# Patient Record
Sex: MALE | Race: WHITE | NOT HISPANIC OR LATINO | Employment: FULL TIME | URBAN - METROPOLITAN AREA
[De-identification: names, ages, dates, MRNs, and addresses within clinical notes are randomized per-mention and may not be internally consistent; named-entity substitution may affect disease eponyms.]

---

## 2021-03-26 ENCOUNTER — VIRTUAL VISIT (OUTPATIENT)
Dept: FAMILY MEDICINE | Facility: CLINIC | Age: 31
End: 2021-03-26
Payer: COMMERCIAL

## 2021-03-26 DIAGNOSIS — Z20.822 EXPOSURE TO COVID-19 VIRUS: Primary | ICD-10-CM

## 2021-03-26 DIAGNOSIS — J02.9 SORE THROAT: ICD-10-CM

## 2021-03-26 DIAGNOSIS — Z20.822 EXPOSURE TO COVID-19 VIRUS: ICD-10-CM

## 2021-03-26 PROCEDURE — 99203 OFFICE O/P NEW LOW 30 MIN: CPT | Mod: 95 | Performed by: NURSE PRACTITIONER

## 2021-03-26 PROCEDURE — U0003 INFECTIOUS AGENT DETECTION BY NUCLEIC ACID (DNA OR RNA); SEVERE ACUTE RESPIRATORY SYNDROME CORONAVIRUS 2 (SARS-COV-2) (CORONAVIRUS DISEASE [COVID-19]), AMPLIFIED PROBE TECHNIQUE, MAKING USE OF HIGH THROUGHPUT TECHNOLOGIES AS DESCRIBED BY CMS-2020-01-R: HCPCS | Performed by: NURSE PRACTITIONER

## 2021-03-26 PROCEDURE — U0005 INFEC AGEN DETEC AMPLI PROBE: HCPCS | Performed by: NURSE PRACTITIONER

## 2021-03-26 RX ORDER — FINASTERIDE 1 MG/1
1 TABLET, FILM COATED ORAL DAILY
COMMUNITY

## 2021-03-26 NOTE — PROGRESS NOTES
Sreekanth is a 30 year old who is being evaluated via a billable video visit.      How would you like to obtain your AVS? MyChart  If the video visit is dropped, the invitation should be resent by: Text to cell phone: [phone]  Will anyone else be joining your video visit? No      Video Start Time: 11:13 AM    Assessment & Plan     Exposure to COVID-19 virus    - Symptomatic COVID-19 Virus (Coronavirus) by PCR; Future    Sore throat    - Symptomatic COVID-19 Virus (Coronavirus) by PCR; Future                 No follow-ups on file.    DAVIN Lizama CNP  [facility] [place]    Subjective   Sreekanth is a 30 year old who presents for the following health issues     HPI     Chief Complaint   Patient presents with     Covid Concern     exposed to covid and would like testing     Allergies     Patient reports allergy to antibiotic that caused paralysis unsure of name.       Patient was visiting family in [place] and notes that his family member was positive.  Exposure was for greater than 15 minutes within 6 feet without masks.  Last exposure was 3/21/21.  He notes a mild headache and mild throat irritation starting today.         Review of Systems   Constitutional, HEENT, cardiovascular, pulmonary, gi and gu systems are negative, except as otherwise noted.      Objective           Vitals:  No vitals were obtained today due to virtual visit.    Physical Exam   GENERAL: Healthy, alert and no distress  EYES: Eyes grossly normal to inspection.  No discharge or erythema, or obvious scleral/conjunctival abnormalities.  RESP: No audible wheeze, cough, or visible cyanosis.  No visible retractions or increased work of breathing.    SKIN: Visible skin clear. No significant rash, abnormal pigmentation or lesions.  NEURO: Cranial nerves grossly intact.  Mentation and speech appropriate for age.  PSYCH: Mentation appears normal, affect normal/bright, judgement and insight intact, normal speech and appearance  well-groomed.                Video-Visit Details    Type of service:  Video Visit    Video End Time:1125    Originating Location (pt. Location): Home    Distant Location (provider location):  [facility]     Platform used for Video Visit: [employer]

## 2021-03-27 LAB
LABORATORY COMMENT REPORT: NORMAL
SARS-COV-2 RNA RESP QL NAA+PROBE: NEGATIVE
SARS-COV-2 RNA RESP QL NAA+PROBE: NORMAL
SPECIMEN SOURCE: NORMAL
SPECIMEN SOURCE: NORMAL

## 2021-03-29 NOTE — RESULT ENCOUNTER NOTE
Dear Sreekanth,    Your recent test results are attached.      No Covid-19 detected.  If you have any symptoms, please let me know and we can retest.  I would continue your quarantine at home.    If you have any questions please feel free to contact [phone] or myself via [employer].    Sincerely,  Tati Ross, CNP

## 2021-05-02 ENCOUNTER — HEALTH MAINTENANCE LETTER (OUTPATIENT)
Age: 31
End: 2021-05-02

## 2021-08-17 ENCOUNTER — VIRTUAL VISIT (OUTPATIENT)
Dept: FAMILY MEDICINE | Facility: CLINIC | Age: 31
End: 2021-08-17
Payer: COMMERCIAL

## 2021-08-17 DIAGNOSIS — R07.0 THROAT PAIN: ICD-10-CM

## 2021-08-17 DIAGNOSIS — H92.01 OTALGIA, RIGHT: ICD-10-CM

## 2021-08-17 DIAGNOSIS — J20.9 ACUTE BRONCHITIS WITH SYMPTOMS > 10 DAYS: Primary | ICD-10-CM

## 2021-08-17 PROCEDURE — 99213 OFFICE O/P EST LOW 20 MIN: CPT | Mod: 95 | Performed by: FAMILY MEDICINE

## 2021-08-17 RX ORDER — AMOXICILLIN 875 MG
875 TABLET ORAL 2 TIMES DAILY
Qty: 20 TABLET | Refills: 0 | Status: SHIPPED | OUTPATIENT
Start: 2021-08-17 | End: 2021-08-27

## 2021-08-17 NOTE — PROGRESS NOTES
Sreekanth is a 31 year old who is being evaluated via a billable video visit.   How would you like to obtain your AVS? MyChart  If the video visit is dropped, the invitation should be resent by: Text to cell phone: [phone]  Will anyone else be joining your video visit? No      Video Start Time: 4:32 PM   Acute bronchitis with symptoms     Comment: I discussed the pathophysiology of bronchitis and likely bacterial vs viral etiology; given yellow phlegm and duration will do antibiotic  Plan: amoxicillin (AMOXIL) 875 MG tablet     Throat pain  Plan: amoxicillin (AMOXIL) 875 MG tablet       Otalgia, right  Comment: referred pain from eustachian tube  Plan: amoxicillin (AMOXIL) 875 MG tablet          Subjective   Sreekanth is a 31 year old who presents for the following health issues   HPI   Acute Illness  Acute illness concerns: Tickle in throat, starting to get ear ache Rt, haring is good, no drainage. A little cough. Had Covid infection in 2019. Mild cough with yellowish green phlegm. Denies loss of sense of smell or taste. Ftaigued, appetite not good.    He is a smoker.     Onset/Duration: last week  Symptoms:  Fever: no  Chills/Sweats: no  Headache (location?): no  Sinus Pressure: no  Conjunctivitis:  no  Ear Pain: YES: right  Rhinorrhea: no  Congestion: no  Sore Throat: YES  Cough: YES  Wheeze: no  Decreased Appetite: no  Nausea: no  Vomiting: no  Diarrhea: no  Dysuria/Freq.: more frequent but probably due to increase in liquids   Dysuria or Hematuria: no  Fatigue/Achiness: no  Sick/Strep Exposure: YES- wife has same issues but okay now  Therapies tried and outcome: ibuprofen, increase in liquids, honey and tea      Review of Systems         Objective           Vitals:  No vitals were obtained today due to virtual visit.    Physical Exam   GENERAL: Healthy, alert and no distress  RESP:  No visible increased work of breathing.    PSYCH: Mentation appears normal, affect normal/bright, normal speech and appearance  well-groomed.        Video-Visit Details    Type of service:  Video Visit    Video End Time:4:49 PM    Originating Location (pt. Location): Home    Distant Location (provider location):  [facility]     Platform used for Video Visit: [employer]

## 2021-09-28 ENCOUNTER — MYC MEDICAL ADVICE (OUTPATIENT)
Dept: FAMILY MEDICINE | Facility: CLINIC | Age: 31
End: 2021-09-28

## 2021-10-05 ENCOUNTER — VIRTUAL VISIT (OUTPATIENT)
Dept: FAMILY MEDICINE | Facility: CLINIC | Age: 31
End: 2021-10-05
Payer: COMMERCIAL

## 2021-10-05 DIAGNOSIS — R09.81 NASAL CONGESTION: Primary | ICD-10-CM

## 2021-10-05 DIAGNOSIS — Z20.822 ENCOUNTER FOR LABORATORY TESTING FOR COVID-19 VIRUS: ICD-10-CM

## 2021-10-05 PROCEDURE — 99213 OFFICE O/P EST LOW 20 MIN: CPT | Mod: 95 | Performed by: PHYSICIAN ASSISTANT

## 2021-10-05 NOTE — PROGRESS NOTES
Sreekanth is a 31 year old who is being evaluated via a billable video visit.      How would you like to obtain your AVS? MyChart  If the video visit is dropped, the invitation should be resent by: Text to cell phone: [phone]  Will anyone else be joining your video visit? No      Video Start Time: 4:56 PM    Assessment & Plan     Encounter for laboratory testing for COVID-19 virus  Nasal congestion  Sounds very congested. Cough may very well be due to post nasal drip as he is coughing up mucus in the morning. Suggest nasal rinse - twice a day for the next few days. Will touch base after covid test, if negative could consider antibiotic for sinus infection if symptoms are persisting. He agrees with the plan. No further questions.   - Symptomatic COVID-19 Virus (Coronavirus) by PCR; Future                   Return in about 2 weeks (around 10/19/2021) for worsening symptoms or failure to improve..    PHONG Santiago [facility]   Sreekanth is a 31 year old who presents for the following health issues     HPI     Acute Illness  Acute illness concerns: URI  Onset/Duration: 9/23/21  Symptoms:  Fever: no  Chills/Sweats: YES  Headache (location?): YES  Sinus Pressure: YES  Conjunctivitis:  no  Ear Pain: no  Rhinorrhea: YES  Congestion: YES  Sore Throat: no  Cough: YES - mucous in the AM  Wheeze: no  Decreased Appetite: YES  Nausea: YES  Vomiting: no  Diarrhea: YES  Dysuria/Freq.: no  Dysuria or Hematuria: no  Fatigue/Achiness: YES  Sick/Strep Exposure: no  Therapies tried and outcome: Muccinex and Claritin, Tylenol    Had a lot of sweating a few times. Never did have a documented fever.   Very congested. No difficulty breathing.         Review of Systems   Constitutional, HEENT, cardiovascular, pulmonary, gi and gu systems are negative, except as otherwise noted.      Objective           Vitals:  No vitals were obtained today due to virtual visit.    Physical Exam   GENERAL: Healthy,  alert and no distress  EYES: Eyes grossly normal to inspection.  No discharge or erythema, or obvious scleral/conjunctival abnormalities.  RESP: No audible wheeze, cough, or visible cyanosis.  No visible retractions or increased work of breathing.    SKIN: Visible skin clear. No significant rash, abnormal pigmentation or lesions.  NEURO: Cranial nerves grossly intact.  Mentation and speech appropriate for age.  PSYCH: Mentation appears normal, affect normal/bright, judgement and insight intact, normal speech and appearance well-groomed.                Video-Visit Details    Type of service:  Video Visit    Video End Time:5:08 PM    Originating Location (pt. Location): Home    Distant Location (provider location):  [facility]     Platform used for Video Visit: [employer]

## 2021-10-06 ENCOUNTER — LAB (OUTPATIENT)
Dept: URGENT CARE | Facility: URGENT CARE | Age: 31
End: 2021-10-06
Attending: PHYSICIAN ASSISTANT
Payer: COMMERCIAL

## 2021-10-06 DIAGNOSIS — R09.81 NASAL CONGESTION: ICD-10-CM

## 2021-10-06 DIAGNOSIS — Z20.822 ENCOUNTER FOR LABORATORY TESTING FOR COVID-19 VIRUS: ICD-10-CM

## 2021-10-06 PROCEDURE — U0005 INFEC AGEN DETEC AMPLI PROBE: HCPCS

## 2021-10-06 PROCEDURE — U0003 INFECTIOUS AGENT DETECTION BY NUCLEIC ACID (DNA OR RNA); SEVERE ACUTE RESPIRATORY SYNDROME CORONAVIRUS 2 (SARS-COV-2) (CORONAVIRUS DISEASE [COVID-19]), AMPLIFIED PROBE TECHNIQUE, MAKING USE OF HIGH THROUGHPUT TECHNOLOGIES AS DESCRIBED BY CMS-2020-01-R: HCPCS

## 2021-10-07 LAB — SARS-COV-2 RNA RESP QL NAA+PROBE: POSITIVE

## 2021-10-17 ENCOUNTER — HEALTH MAINTENANCE LETTER (OUTPATIENT)
Age: 31
End: 2021-10-17

## 2022-03-30 ENCOUNTER — OFFICE VISIT (OUTPATIENT)
Dept: FAMILY MEDICINE | Facility: CLINIC | Age: 32
End: 2022-03-30
Payer: COMMERCIAL

## 2022-03-30 VITALS
RESPIRATION RATE: 14 BRPM | DIASTOLIC BLOOD PRESSURE: 62 MMHG | HEIGHT: 70 IN | SYSTOLIC BLOOD PRESSURE: 114 MMHG | HEART RATE: 72 BPM | WEIGHT: 188.8 LBS | TEMPERATURE: 98.3 F | OXYGEN SATURATION: 97 % | BODY MASS INDEX: 27.03 KG/M2

## 2022-03-30 DIAGNOSIS — H65.91 OME (OTITIS MEDIA WITH EFFUSION), RIGHT: ICD-10-CM

## 2022-03-30 DIAGNOSIS — H72.91 RUPTURED EAR DRUM, RIGHT: Primary | ICD-10-CM

## 2022-03-30 PROCEDURE — 99213 OFFICE O/P EST LOW 20 MIN: CPT | Performed by: FAMILY MEDICINE

## 2022-03-30 RX ORDER — AMOXICILLIN 875 MG
875 TABLET ORAL 2 TIMES DAILY
Qty: 14 TABLET | Refills: 0 | Status: SHIPPED | OUTPATIENT
Start: 2022-03-30 | End: 2022-08-04

## 2022-03-30 NOTE — PROGRESS NOTES
"S  Sreekanth Thornton is a 31 year old male here for right ear pain.     1 month ago, his right ear drum burst. He had a bad cold and rolled onto his right ear and felt a \"pop\" and then couldn't hear and could feel fluid inside, bloody discharge came out. It hurt and was healing, but then burst on 3/10 again.     Now, he hasn't had the bursting sensation but the right ear started to hurt last night. Right side of throat feels tight.     He had ear  tubes placed as a child and states he has had a lot of ear infections and ruptured ear drums. He has not seen ENT in quite some time.   ?  O  /62   Pulse 72   Temp 98.3  F (36.8  C) (Oral)   Resp 14   Ht 1.781 m (5' 10.12\")   Wt 85.6 kg (188 lb 12.8 oz)   SpO2 97%   BMI 27.00 kg/m     Vitals reviewed. Nursing note reviewed.  General Appearance: Pleasant and alert, in no acute distress  HEENT: Left TM is normal, not bulging. Right TM has some raw tissue around it with what appears to be a healing rupture, with surrounding erythema and some blood. TM is bulging.   Skin: warm, dry, intact, no rash noted  Neuro: no focal deficits, CNs II-XII normal.   Psych: mood and affect are normal.    A/P  Sreekanth was seen today for otalgia.    Diagnoses and all orders for this visit:    Ruptured ear drum, right: right ear does appear infected. Will treat with amoxicillin and refer to ENT due to his complicated history.   -     amoxicillin (AMOXIL) 875 MG tablet; Take 1 tablet (875 mg) by mouth 2 times daily  -     Otolaryngology Referral; Future    OME (otitis media with effusion), right  -     amoxicillin (AMOXIL) 875 MG tablet; Take 1 tablet (875 mg) by mouth 2 times daily  -     Otolaryngology Referral; Future    Other orders  -     REVIEW OF HEALTH MAINTENANCE PROTOCOL ORDERS         No follow-ups on file.    Options for treatment and follow-up care were reviewed with the patient and/or guardian. Sreekanth Thornton and/or guardian engaged in the decision making process " and verbalized understanding of the options discussed and agreed with the final plan.    Shobha Maldonado MD

## 2022-05-29 ENCOUNTER — HEALTH MAINTENANCE LETTER (OUTPATIENT)
Age: 32
End: 2022-05-29

## 2022-07-26 ASSESSMENT — ENCOUNTER SYMPTOMS
HEARTBURN: 0
HEMATOCHEZIA: 0
EYE PAIN: 0
ABDOMINAL PAIN: 0
SORE THROAT: 0
PALPITATIONS: 0
FEVER: 0
FREQUENCY: 0
WEAKNESS: 0
NAUSEA: 0
SHORTNESS OF BREATH: 0
MYALGIAS: 0
HEMATURIA: 0
DYSURIA: 0
PARESTHESIAS: 0
DIZZINESS: 0
COUGH: 0
HEADACHES: 0
NERVOUS/ANXIOUS: 0
CHILLS: 0
JOINT SWELLING: 0
CONSTIPATION: 0
DIARRHEA: 0
ARTHRALGIAS: 0

## 2022-08-02 ENCOUNTER — OFFICE VISIT (OUTPATIENT)
Dept: FAMILY MEDICINE | Facility: CLINIC | Age: 32
End: 2022-08-02
Payer: COMMERCIAL

## 2022-08-02 VITALS
RESPIRATION RATE: 18 BRPM | DIASTOLIC BLOOD PRESSURE: 84 MMHG | OXYGEN SATURATION: 100 % | BODY MASS INDEX: 27.47 KG/M2 | HEIGHT: 71 IN | TEMPERATURE: 97.9 F | HEART RATE: 60 BPM | WEIGHT: 196.2 LBS | SYSTOLIC BLOOD PRESSURE: 132 MMHG

## 2022-08-02 DIAGNOSIS — Z00.00 ROUTINE HISTORY AND PHYSICAL EXAMINATION OF ADULT: Primary | ICD-10-CM

## 2022-08-02 DIAGNOSIS — Z11.59 NEED FOR HEPATITIS C SCREENING TEST: ICD-10-CM

## 2022-08-02 DIAGNOSIS — L91.8 SKIN TAG: ICD-10-CM

## 2022-08-02 DIAGNOSIS — D22.9 NUMEROUS SKIN MOLES: ICD-10-CM

## 2022-08-02 LAB
ANION GAP SERPL CALCULATED.3IONS-SCNC: 4 MMOL/L (ref 3–14)
BUN SERPL-MCNC: 14 MG/DL (ref 7–30)
CALCIUM SERPL-MCNC: 9.5 MG/DL (ref 8.5–10.1)
CHLORIDE BLD-SCNC: 104 MMOL/L (ref 94–109)
CHOLEST SERPL-MCNC: 230 MG/DL
CO2 SERPL-SCNC: 29 MMOL/L (ref 20–32)
CREAT SERPL-MCNC: 1.01 MG/DL (ref 0.66–1.25)
FASTING STATUS PATIENT QL REPORTED: ABNORMAL
GFR SERPL CREATININE-BSD FRML MDRD: >90 ML/MIN/1.73M2
GLUCOSE BLD-MCNC: 89 MG/DL (ref 70–99)
HCV AB SERPL QL IA: NONREACTIVE
HDLC SERPL-MCNC: 46 MG/DL
LDLC SERPL CALC-MCNC: 144 MG/DL
NONHDLC SERPL-MCNC: 184 MG/DL
POTASSIUM BLD-SCNC: 4.1 MMOL/L (ref 3.4–5.3)
SODIUM SERPL-SCNC: 137 MMOL/L (ref 133–144)
TRIGL SERPL-MCNC: 200 MG/DL

## 2022-08-02 PROCEDURE — 80061 LIPID PANEL: CPT | Performed by: FAMILY MEDICINE

## 2022-08-02 PROCEDURE — 86803 HEPATITIS C AB TEST: CPT | Performed by: FAMILY MEDICINE

## 2022-08-02 PROCEDURE — 99395 PREV VISIT EST AGE 18-39: CPT | Performed by: FAMILY MEDICINE

## 2022-08-02 PROCEDURE — 36415 COLL VENOUS BLD VENIPUNCTURE: CPT | Performed by: FAMILY MEDICINE

## 2022-08-02 PROCEDURE — 80048 BASIC METABOLIC PNL TOTAL CA: CPT | Performed by: FAMILY MEDICINE

## 2022-08-02 ASSESSMENT — ENCOUNTER SYMPTOMS
DIARRHEA: 0
CONSTIPATION: 0
DYSURIA: 0
WEAKNESS: 0
SORE THROAT: 0
ARTHRALGIAS: 0
HEARTBURN: 0
HEMATURIA: 0
MYALGIAS: 0
SHORTNESS OF BREATH: 0
HEADACHES: 0
FREQUENCY: 0
NERVOUS/ANXIOUS: 0
FEVER: 0
CHILLS: 0
ABDOMINAL PAIN: 0
JOINT SWELLING: 0
PARESTHESIAS: 0
NAUSEA: 0
HEMATOCHEZIA: 0
EYE PAIN: 0
COUGH: 0
PALPITATIONS: 0
DIZZINESS: 0

## 2022-08-02 ASSESSMENT — PAIN SCALES - GENERAL: PAINLEVEL: NO PAIN (0)

## 2022-08-02 NOTE — PROGRESS NOTES
SUBJECTIVE:   CC: Sreekanth Thornton is an 32 year old male who presents for preventative health visit.   Healthy Habits:     Getting at least 3 servings of Calcium per day:  NO    Bi-annual eye exam:  Yes    Dental care twice a year:  Yes    Sleep apnea or symptoms of sleep apnea:  None    Diet:  Regular (no restrictions) and Other    Frequency of exercise:  2-3 days/week    Duration of exercise:  30-45 minutes    Taking medications regularly:  Yes    Medication side effects:  Not applicable    PHQ-2 Total Score: 0    Additional concerns today:  Yes    PROBLEMS TO ADD ON...  Mole/Skin tag: Genital was brown and flat and now elevated and purplish. Size same    Today's PHQ-2 Score:   PHQ-2 ( 1999 Pfizer) 7/26/2022   Q1: Little interest or pleasure in doing things 0   Q2: Feeling down, depressed or hopeless 0   PHQ-2 Score 0   PHQ-2 Total Score (12-17 Years)- Positive if 3 or more points; Administer PHQ-A if positive -   Q1: Little interest or pleasure in doing things Not at all   Q2: Feeling down, depressed or hopeless Not at all   PHQ-2 Score 0     Abuse: Current or Past(Physical, Sexual or Emotional)- No  Do you feel safe in your environment? Yes    Social History     Tobacco Use     Smoking status: Former Smoker     Packs/day: 0.00     Years: 10.00     Pack years: 0.00     Types: Cigarettes     Start date: 1/1/2010     Quit date: 1/1/2019     Years since quitting: 3.5     Smokeless tobacco: Never Used     Tobacco comment: Haven't used in well over 6 months and don't plan to   Substance Use Topics     Alcohol use: Yes     If you drink alcohol do you typically have >3 drinks per day or >7 drinks per week? Yes    Alcohol Use 7/26/2022   Prescreen: >3 drinks/day or >7 drinks/week? Yes   AUDIT SCORE  5     AUDIT - Alcohol Use Disorders Identification Test - Reproduced from the World Health Organization Audit 2001 (Second Edition) 7/26/2022   1.  How often do you have a drink containing alcohol? 4 or more times a week    2.  How many drinks containing alcohol do you have on a typical day when you are drinking? 1 or 2   3.  How often do you have five or more drinks on one occasion? Less than monthly   4.  How often during the last year have you found that you were not able to stop drinking once you had started? Never   5.  How often during the last year have you failed to do what was normally expected of you because of drinking? Never   6.  How often during the last year have you needed a first drink in the morning to get yourself going after a heavy drinking session? Never   7.  How often during the last year have you had a feeling of guilt or remorse after drinking? Never   8.  How often during the last year have you been unable to remember what happened the night before because of your drinking? Never   9.  Have you or someone else been injured because of your drinking? No   10. Has a relative, friend, doctor or other health care worker been concerned about your drinking or suggested you cut down? No   TOTAL SCORE 5       Last PSA: No results found for: PSA    Reviewed orders with patient. Reviewed health maintenance and updated orders accordingly - Yes  There is no problem list on file for this patient.    Past Surgical History:   Procedure Laterality Date     TONSILLECTOMY & ADENOIDECTOMY         Social History     Tobacco Use     Smoking status: Former Smoker     Packs/day: 0.00     Years: 10.00     Pack years: 0.00     Types: Cigarettes     Start date: 1/1/2010     Quit date: 1/1/2019     Years since quitting: 3.5     Smokeless tobacco: Never Used     Tobacco comment: Haven't used in well over 6 months and don't plan to   Substance Use Topics     Alcohol use: Yes     Family History   Problem Relation Age of Onset     Hypertension Father      Rheumatoid Arthritis Mother      Diabetes Maternal Grandmother      Cancer Maternal Grandfather      Breast Cancer Maternal Grandfather      Breast Cancer Paternal Grandmother       "      Reviewed and updated as needed this visit by clinical staff   Tobacco  Allergies  Meds   Med Hx  Surg Hx  Fam Hx  Soc Hx        Reviewed and updated as needed this visit by Provider                   Review of Systems   Constitutional: Negative for chills and fever.   HENT: Negative for congestion, ear pain, hearing loss and sore throat.    Eyes: Negative for pain and visual disturbance.   Respiratory: Negative for cough and shortness of breath.    Cardiovascular: Negative for chest pain, palpitations and peripheral edema.   Gastrointestinal: Negative for abdominal pain, constipation, diarrhea, heartburn, hematochezia and nausea.   Genitourinary: Negative for dysuria, frequency, genital sores, hematuria, impotence, penile discharge and urgency.   Musculoskeletal: Negative for arthralgias, joint swelling and myalgias.   Skin: Negative for rash.   Neurological: Negative for dizziness, weakness, headaches and paresthesias.   Psychiatric/Behavioral: Negative for mood changes. The patient is not nervous/anxious.        OBJECTIVE:   /84 (BP Location: Right arm)   Pulse 60   Temp 97.9  F (36.6  C) (Oral)   Resp 18   Ht 1.81 m (5' 11.26\")   Wt 89 kg (196 lb 3.2 oz)   SpO2 100%   BMI 27.17 kg/m      Physical Exam  GENERAL: healthy, alert and no distress  EYES: Eyes grossly normal to inspection, PERRL and conjunctivae and sclerae normal  HENT: ear canals and TM's normal, nose and mouth without ulcers or lesions  NECK: no adenopathy and thyroid normal to palpation  RESP: lungs clear to auscultation - no rales, rhonchi or wheezes  CV: regular rate and rhythm, normal S1 S2, no S3 or S4, no murmur, click or rub, no peripheral edema   ABDOMEN: soft, nontender, no hepatosplenomegaly, no masses and bowel sounds normal  MS: no gross musculoskeletal defects noted, no edema  SKIN: skin moles and tags  NEURO: Normal strength and tone, mentation intact and speech normal  PSYCH: mentation appears normal, affect " "normal/bright    Diagnostic Test Results:  Labs reviewed in Epic    ASSESSMENT/PLAN:   Sreekanth was seen today for physical.    Diagnoses and all orders for this visit:    Routine history and physical examination of adult  -     Lipid Profile (Chol, Trig, HDL, LDL calc); Future  -     Basic metabolic panel  (Ca, Cl, CO2, Creat, Gluc, K, Na, BUN); Future  -     Lipid Profile (Chol, Trig, HDL, LDL calc)  -     Basic metabolic panel  (Ca, Cl, CO2, Creat, Gluc, K, Na, BUN)    Numerous skin moles    Need for hepatitis C screening test  -     Hepatitis C Screen Reflex to HCV RNA Quant and Genotype; Future  -     Hepatitis C Screen Reflex to HCV RNA Quant and Genotype    Skin tag (Lt groin)        Patient has been advised of split billing requirements and indicates understanding: Yes    COUNSELING:   Reviewed preventive health counseling, as reflected in patient instructions       Regular exercise       Healthy diet/nutrition       Consider Hep C screening for all patients one time for ages 18-79 years    Estimated body mass index is 27.17 kg/m  as calculated from the following:    Height as of this encounter: 1.81 m (5' 11.26\").    Weight as of this encounter: 89 kg (196 lb 3.2 oz).     Weight management plan: Discussed healthy diet and exercise guidelines    He reports that he quit smoking about 3 years ago. His smoking use included cigarettes. He started smoking about 12 years ago. He smoked 0.00 packs per day for 10.00 years. He has never used smokeless tobacco.      Counseling Resources:  ATP IV Guidelines  Pooled Cohorts Equation Calculator  FRAX Risk Assessment  ICSI Preventive Guidelines  Dietary Guidelines for Americans, 2010  USDA's MyPlate  ASA Prophylaxis  Lung CA Screening    Cal Newberry MD  [facility]  "

## 2022-09-21 ENCOUNTER — MYC MEDICAL ADVICE (OUTPATIENT)
Dept: FAMILY MEDICINE | Facility: CLINIC | Age: 32
End: 2022-09-21

## 2022-09-21 NOTE — TELEPHONE ENCOUNTER
Responded to patient via [employer] provided contact number to the ENT being referred and pharmacy of antibiotic being sent to provided.      TAY HartN, RN  [facility]

## 2022-10-02 ENCOUNTER — HEALTH MAINTENANCE LETTER (OUTPATIENT)
Age: 32
End: 2022-10-02

## 2022-10-24 ENCOUNTER — E-VISIT (OUTPATIENT)
Dept: FAMILY MEDICINE | Facility: CLINIC | Age: 32
End: 2022-10-24
Payer: COMMERCIAL

## 2022-10-24 DIAGNOSIS — H66.90 CHRONIC OTITIS MEDIA, UNSPECIFIED OTITIS MEDIA TYPE: Primary | ICD-10-CM

## 2022-10-24 PROCEDURE — 99421 OL DIG E/M SVC 5-10 MIN: CPT | Performed by: FAMILY MEDICINE

## 2022-10-24 NOTE — PATIENT INSTRUCTIONS
Thank you for choosing us for your care. I have placed an order for a prescription so that you can start treatment. View your full visit summary for details by clicking on the link below. Your pharmacist will able to address any questions you may have about the medication.     If you're not feeling better within 5-7 days, please schedule an appointment.  You can schedule an appointment right here in [facility], or call [phone]  If the visit is for the same symptoms as your eVisit, we'll refund the cost of your eVisit if seen within seven days.

## 2022-11-16 ENCOUNTER — OFFICE VISIT (OUTPATIENT)
Dept: OTOLARYNGOLOGY | Facility: CLINIC | Age: 32
End: 2022-11-16
Attending: FAMILY MEDICINE
Payer: COMMERCIAL

## 2022-11-16 ENCOUNTER — OFFICE VISIT (OUTPATIENT)
Dept: AUDIOLOGY | Facility: CLINIC | Age: 32
End: 2022-11-16
Attending: FAMILY MEDICINE
Payer: COMMERCIAL

## 2022-11-16 VITALS
OXYGEN SATURATION: 99 % | SYSTOLIC BLOOD PRESSURE: 128 MMHG | DIASTOLIC BLOOD PRESSURE: 84 MMHG | HEART RATE: 76 BPM | RESPIRATION RATE: 16 BRPM

## 2022-11-16 DIAGNOSIS — H90.2 CONDUCTIVE HEARING LOSS, UNILATERAL: Primary | ICD-10-CM

## 2022-11-16 DIAGNOSIS — H90.2 CONDUCTIVE HEARING LOSS, UNILATERAL: ICD-10-CM

## 2022-11-16 DIAGNOSIS — H65.91 OME (OTITIS MEDIA WITH EFFUSION), RIGHT: ICD-10-CM

## 2022-11-16 DIAGNOSIS — H90.11 CONDUCTIVE HEARING LOSS OF RIGHT EAR WITH UNRESTRICTED HEARING OF LEFT EAR: ICD-10-CM

## 2022-11-16 PROCEDURE — 99203 OFFICE O/P NEW LOW 30 MIN: CPT | Performed by: OTOLARYNGOLOGY

## 2022-11-16 PROCEDURE — 92557 COMPREHENSIVE HEARING TEST: CPT | Performed by: AUDIOLOGIST

## 2022-11-16 PROCEDURE — 92550 TYMPANOMETRY & REFLEX THRESH: CPT | Performed by: AUDIOLOGIST

## 2022-11-16 RX ORDER — PSEUDOEPHEDRINE HCL 120 MG/1
120 TABLET, FILM COATED, EXTENDED RELEASE ORAL DAILY
Qty: 30 TABLET | Refills: 0 | Status: SHIPPED | OUTPATIENT
Start: 2022-11-16

## 2022-11-16 NOTE — PROGRESS NOTES
AUDIOLOGY REPORT:    Patient was referred from ENT by Carlyle Jimenez MD for audiology evaluation. he reports multiple ear infections over his lifetime with multiple ear drum perforations.  Occasional tinnitus, otalgia, and past noise exposure are reported.Patient reports PE tubes as a toddler.    Testing:    Otoscopy:   Otoscopic exam indicates ears are clear of cerumen bilaterally     Tympanograms:    RIGHT: restricted eardrum mobility      LEFT:   normal eardrum mobility    Reflexes (reported by stimulus ear):  RIGHT: Ipsilateral is elevated at frequencies tested  RIGHT: Contralateral is absent at frequencies tested  LEFT:   Ipsilateral is absent at frequencies tested  LEFT:   Contralateral is absent at frequencies tested    Thresholds:   Pure Tone Thresholds assessed using conventional audiometry with good reliability from 250-8000 Hz bilaterally using insert earphones and circumaural headphones      RIGHT:  normal and borderline-normal sloping to mild, mild-moderate and moderate conductive hearing loss    LEFT:    normal hearing sensitivity    Speech Reception Threshold:    RIGHT: 20 dB HL    LEFT:   10 dB HL  Speech Reception Thresholds are in good agreement with pure tone thresholds.    Word Recognition Score:     RIGHT: 100% at 60 dB HL using NU-6 recorded word list.    LEFT:   100% at 50 dB HL using NU-6 recorded word list.    Discussed results with the patient.     Patient was returned to ENT for follow up.     Fabian Sethi MA, CCC-A  Licensed Audiologist #[id number]  11/16/2022

## 2022-11-16 NOTE — LETTER
11/16/2022         RE: Sreekanth Thornton  [address] [facility] [address]        Dear Colleague,    Thank you for referring your patient, Sreekanth Thornton, to the [facility]. Please see a copy of my visit note below.    I am seeing this patient in consultation for chronic suppurative otitis media right ear at the request of the provider Shobha Moran.    Chief Complaint - Hearing loss    History of Present Illness - Sreekanth Thornton is a 32 year old male who presents right ear hearing loss. In the last 18 months he has had multiple ear infections and feeling the right tympanic membrane has ruptured. No left ear infection. He had tubes as a child. He has had drainage 3 times when he feels his tympanic membrane has ruptured. He feels the hearing goes back to normal, but not the last couple months. It can be very painful, but not now. Quit smoking a couple years ago. He has allergies and that has been bad the last 2 months. Any time he has congestion he seems to get ear plugging. Had T&A as a child.    Tests personally reviewed today for this visit:   Audiogram - normal left hearing, right hearing conductive hearing loss  Tympanograms - type A left, type B right.     Past Medical History - healthy     Current Medications -   Current Outpatient Medications:      amoxicillin-clavulanate (AUGMENTIN) 875-125 MG tablet, Take 1 tablet by mouth 2 times daily, Disp: 14 tablet, Rfl: 0     finasteride (PROPECIA) 1 MG tablet, Take 1 mg by mouth daily, Disp: , Rfl:     Allergies -   Allergies   Allergen Reactions     Azithromycin Unknown     Biaxin [Clarithromycin]      Ceclor [Cefaclor]      Erythromycin      Fusarium      Other Drug Allergy (See Comments)      Pediazole  Nigrospora     Penicillins      Rocephin [Ceftriaxone]        Social History -   Social History     Socioeconomic History     Marital status:    Tobacco Use     Smoking status: Former     Packs/day: 0.00     Years:  10.00     Pack years: 0.00     Types: Cigarettes     Start date: 1/1/2010     Quit date: 1/1/2019     Years since quitting: 3.8     Smokeless tobacco: Never     Tobacco comments:     Haven't used in well over 6 months and don't plan to   Substance and Sexual Activity     Alcohol use: Yes     Drug use: Not Currently     Types: Marijuana     Sexual activity: Yes     Partners: Female     Birth control/protection: None     Comment:    Other Topics Concern     Parent/sibling w/ CABG, MI or angioplasty before 65F 55M? No       Family History -   Family History   Problem Relation Age of Onset     Hypertension Father      Rheumatoid Arthritis Mother      Diabetes Maternal Grandmother      Cancer Maternal Grandfather      Breast Cancer Maternal Grandfather      Breast Cancer Paternal Grandmother        Review of Systems - As per HPI and PMHx, otherwise 7 system review of the head and neck negative.    Physical Exam  /84   Pulse 76   Resp 16   SpO2 99%   General - The patient is nontoxic, in no distress.  Alert and oriented to person and place, answers questions and cooperates with examination appropriately.   Voice and Breathing - The patient was breathing comfortably without the use of accessory muscles. There was no wheezing, stridor, or stertor.  The patients voice was clear and strong.  Ears - clean canals. The tympanic membrane on the right is intact, but appears dull with a middle ear effusion. No acute infection. No fluid or purulence was seen in the external canal. The tympanic membrane on the left is intact, no middle ear effusion. No acute infection.  No fluid or purulence was seen in the external canal.   Nose - Septum midline. Turbinates normal size. Airway patent bilaterally. No polyps, masses, or pus.   Eyes - Extraocular movements intact. Sclera were not icteric or injected.  Mouth - Examination of the oral cavity showed pink, healthy mucosa. No lesions or ulcerations noted.  The tongue was  mobile and midline.  Throat - The walls of the oropharynx were smooth, symmetric, and had no lesions or ulcerations.  The uvula was midline on elevation.    Neck - Soft, non-tender. Palpation of the occipital, submental, submandibular, internal jugular chain, and supraclavicular nodes did not demonstrate any abnormal lymph nodes or masses. No parotid masses. Palpation of the thyroid was soft and smooth, with no nodules or goiter appreciated.  The trachea was mobile and midline.  Neurological - Cranial nerves 2 through 12 were grossly intact. House-Brackmann grade 1 out of 6 bilaterally.    Assessment and Plan - Sreekanth Thornton is a 32 year old male who presents to me today with hearing loss.  This is most consistent with a middle ear effusion in the right ear. This is likely due to transient eustachian tube dysfunction following an upper respiratory infection and allergies.  However he seems to get this and/or acute otitis media of the right ear a few times a year.  I have advised trying sudafed, flonase, zyrtec and daily valsalva to try and improve the eustachian tube function.  If this fails I recommend returning for right myringotomy with tube placement.      Jesus Jimenez MD  Otolaryngology  [facility]          Again, thank you for allowing me to participate in the care of your patient.        Sincerely,        Jesus Jimenez MD

## 2022-11-16 NOTE — PROGRESS NOTES
I am seeing this patient in consultation for chronic suppurative otitis media right ear at the request of the provider Shobha Moran.    Chief Complaint - Hearing loss    History of Present Illness - Sreekanth Thornton is a 32 year old male who presents right ear hearing loss. In the last 18 months he has had multiple ear infections and feeling the right tympanic membrane has ruptured. No left ear infection. He had tubes as a child. He has had drainage 3 times when he feels his tympanic membrane has ruptured. He feels the hearing goes back to normal, but not the last couple months. It can be very painful, but not now. Quit smoking a couple years ago. He has allergies and that has been bad the last 2 months. Any time he has congestion he seems to get ear plugging. Had T&A as a child.    Tests personally reviewed today for this visit:   Audiogram - normal left hearing, right hearing conductive hearing loss  Tympanograms - type A left, type B right.     Past Medical History - healthy     Current Medications -   Current Outpatient Medications:      amoxicillin-clavulanate (AUGMENTIN) 875-125 MG tablet, Take 1 tablet by mouth 2 times daily, Disp: 14 tablet, Rfl: 0     finasteride (PROPECIA) 1 MG tablet, Take 1 mg by mouth daily, Disp: , Rfl:     Allergies -   Allergies   Allergen Reactions     Azithromycin Unknown     Biaxin [Clarithromycin]      Ceclor [Cefaclor]      Erythromycin      Fusarium      Other Drug Allergy (See Comments)      Pediazole  Nigrospora     Penicillins      Rocephin [Ceftriaxone]        Social History -   Social History     Socioeconomic History     Marital status:    Tobacco Use     Smoking status: Former     Packs/day: 0.00     Years: 10.00     Pack years: 0.00     Types: Cigarettes     Start date: 1/1/2010     Quit date: 1/1/2019     Years since quitting: 3.8     Smokeless tobacco: Never     Tobacco comments:     Haven't used in well over 6 months and don't plan to   Substance and  Sexual Activity     Alcohol use: Yes     Drug use: Not Currently     Types: Marijuana     Sexual activity: Yes     Partners: Female     Birth control/protection: None     Comment:    Other Topics Concern     Parent/sibling w/ CABG, MI or angioplasty before 65F 55M? No       Family History -   Family History   Problem Relation Age of Onset     Hypertension Father      Rheumatoid Arthritis Mother      Diabetes Maternal Grandmother      Cancer Maternal Grandfather      Breast Cancer Maternal Grandfather      Breast Cancer Paternal Grandmother        Review of Systems - As per HPI and PMHx, otherwise 7 system review of the head and neck negative.    Physical Exam  /84   Pulse 76   Resp 16   SpO2 99%   General - The patient is nontoxic, in no distress.  Alert and oriented to person and place, answers questions and cooperates with examination appropriately.   Voice and Breathing - The patient was breathing comfortably without the use of accessory muscles. There was no wheezing, stridor, or stertor.  The patients voice was clear and strong.  Ears - clean canals. The tympanic membrane on the right is intact, but appears dull with a middle ear effusion. No acute infection. No fluid or purulence was seen in the external canal. The tympanic membrane on the left is intact, no middle ear effusion. No acute infection.  No fluid or purulence was seen in the external canal.   Nose - Septum midline. Turbinates normal size. Airway patent bilaterally. No polyps, masses, or pus.   Eyes - Extraocular movements intact. Sclera were not icteric or injected.  Mouth - Examination of the oral cavity showed pink, healthy mucosa. No lesions or ulcerations noted.  The tongue was mobile and midline.  Throat - The walls of the oropharynx were smooth, symmetric, and had no lesions or ulcerations.  The uvula was midline on elevation.    Neck - Soft, non-tender. Palpation of the occipital, submental, submandibular, internal jugular  chain, and supraclavicular nodes did not demonstrate any abnormal lymph nodes or masses. No parotid masses. Palpation of the thyroid was soft and smooth, with no nodules or goiter appreciated.  The trachea was mobile and midline.  Neurological - Cranial nerves 2 through 12 were grossly intact. House-Brackmann grade 1 out of 6 bilaterally.    Assessment and Plan - Sreekanth Thornton is a 32 year old male who presents to me today with hearing loss.  This is most consistent with a middle ear effusion in the right ear. This is likely due to transient eustachian tube dysfunction following an upper respiratory infection and allergies.  However he seems to get this and/or acute otitis media of the right ear a few times a year.  I have advised trying sudafed, flonase, zyrtec and daily valsalva to try and improve the eustachian tube function.  If this fails I recommend returning for right myringotomy with tube placement.      Jesus Jimenez MD  Otolaryngology  [facility]

## 2023-10-21 ENCOUNTER — HEALTH MAINTENANCE LETTER (OUTPATIENT)
Age: 33
End: 2023-10-21

## 2023-12-07 ASSESSMENT — ENCOUNTER SYMPTOMS
PALPITATIONS: 0
JOINT SWELLING: 0
FREQUENCY: 0
DYSURIA: 0
HEMATURIA: 0
HEADACHES: 0
FEVER: 0
HEMATOCHEZIA: 0
EYE PAIN: 0
CONSTIPATION: 0
NERVOUS/ANXIOUS: 0
ARTHRALGIAS: 0
SHORTNESS OF BREATH: 0
ABDOMINAL PAIN: 0
SORE THROAT: 0
CHILLS: 0
NAUSEA: 0
WEAKNESS: 0
MYALGIAS: 0
DIARRHEA: 1
COUGH: 0
PARESTHESIAS: 0
DIZZINESS: 0
HEARTBURN: 0

## 2023-12-12 ENCOUNTER — OFFICE VISIT (OUTPATIENT)
Dept: FAMILY MEDICINE | Facility: CLINIC | Age: 33
End: 2023-12-12
Payer: COMMERCIAL

## 2023-12-12 VITALS
TEMPERATURE: 97.5 F | HEIGHT: 71 IN | DIASTOLIC BLOOD PRESSURE: 81 MMHG | HEART RATE: 98 BPM | SYSTOLIC BLOOD PRESSURE: 128 MMHG | BODY MASS INDEX: 27.5 KG/M2 | OXYGEN SATURATION: 98 % | RESPIRATION RATE: 16 BRPM | WEIGHT: 196.4 LBS

## 2023-12-12 DIAGNOSIS — R19.5 LOOSE STOOLS: ICD-10-CM

## 2023-12-12 DIAGNOSIS — Z00.00 ROUTINE HISTORY AND PHYSICAL EXAMINATION OF ADULT: Primary | ICD-10-CM

## 2023-12-12 DIAGNOSIS — E78.5 HYPERLIPIDEMIA LDL GOAL <130: ICD-10-CM

## 2023-12-12 DIAGNOSIS — L65.9 HAIR LOSS: ICD-10-CM

## 2023-12-12 PROCEDURE — 99395 PREV VISIT EST AGE 18-39: CPT | Performed by: FAMILY MEDICINE

## 2023-12-12 NOTE — PROGRESS NOTES
"SUBJECTIVE:   Sreekanth is a 33 year old, presenting for the following:  Physical        2023    11:37 AM   Additional Questions   Roomed by MIGUEL ÁNGEL Gonzalez    Today's PHQ-2 Score:       2023    11:36 AM   PHQ-2 (  [employer])   Q1: Little interest or pleasure in doing things 0   Q2: Feeling down, depressed or hopeless 0   PHQ-2 Score 0   Q1: Little interest or pleasure in doing things Not at all   Q2: Feeling down, depressed or hopeless Not at all   PHQ-2 Score 0     Shots:  TDAP: had one in last 10 yrs   Defers: Covid, Flu shot    PROBLEMS TO ADD ON...    Hair Loss:   - Been on Minoxidil 5% and Finasteride 1 mg    Loose stools with breads: ?    Keri pizza    Have you ever done Advance Care Planning? (For example, a Health Directive, POLST, or a discussion with a medical provider or your loved ones about your wishes): No, advance care planning information given to patient to review.  Patient plans to discuss their wishes with loved ones or provider.      Social History     Tobacco Use    Smoking status: Former     Packs/day: 0.00     Years: 10.00     Additional pack years: 0.00     Total pack years: 0.00     Types: Cigarettes     Start date: 2010     Quit date: 2019     Years since quittin.9    Smokeless tobacco: Never    Tobacco comments:     Haven't used in well over 6 months and don't plan to   Substance Use Topics    Alcohol use: Yes           2023    11:30 AM   Alcohol Use   Prescreen: >3 drinks/day or >7 drinks/week? No       Last PSA: No results found for: \"PSA\"    Reviewed orders with patient. Reviewed health maintenance and updated orders accordingly - Yes  There is no problem list on file for this patient.    Past Surgical History:   Procedure Laterality Date    TONSILLECTOMY & ADENOIDECTOMY         Social History     Tobacco Use    Smoking status: Former     Packs/day: 0.00     Years: 10.00     Additional pack years: 0.00     Total pack years: 0.00     Types: Cigarettes     " "Start date: 2010     Quit date: 2019     Years since quittin.9    Smokeless tobacco: Never    Tobacco comments:     Haven't used in well over 6 months and don't plan to   Substance Use Topics    Alcohol use: Yes     Family History   Problem Relation Age of Onset    Hypertension Father     Rheumatoid Arthritis Mother     Diabetes Maternal Grandmother     Cancer Maternal Grandfather     Breast Cancer Maternal Grandfather     Breast Cancer Paternal Grandmother            Reviewed and updated as needed this visit by clinical staff   Tobacco  Allergies  Meds              Reviewed and updated as needed this visit by Provider                     Review of Systems  CONSTITUTIONAL: NEGATIVE for fever, chills, change in weight  INTEGUMENTARY/SKIN: NEGATIVE for worrisome rashes, moles or lesions  EYES: NEGATIVE for vision changes or irritation  ENT: NEGATIVE for ear, mouth and throat problems  RESP: NEGATIVE for significant cough or SOB  CV: NEGATIVE for chest pain, palpitations or peripheral edema  GI: NEGATIVE for nausea, abdominal pain, heartburn, or change in bowel habits   male: negative for dysuria, hematuria, decreased urinary stream, erectile dysfunction, urethral discharge  MUSCULOSKELETAL: NEGATIVE for significant arthralgias or myalgia  NEURO: NEGATIVE for weakness, dizziness or paresthesias  PSYCHIATRIC: NEGATIVE for changes in mood or affect    OBJECTIVE:   /81 (BP Location: Left arm, Patient Position: Sitting, Cuff Size: Adult Regular)   Pulse 98   Temp 97.5  F (36.4  C) (Oral)   Resp 16   Ht 1.803 m (5' 11\")   Wt 89.1 kg (196 lb 6.4 oz)   SpO2 98%   BMI 27.39 kg/m      Physical Exam  GENERAL: healthy, alert and no distress  EYES: Eyes grossly normal to inspection, PERRL and conjunctivae and sclerae normal  HENT: ear canals and TM's normal, nose and mouth without ulcers or lesions  NECK: no adenopathy and thyroid normal to palpation  RESP: lungs clear to auscultation - no rales, " rhonchi or wheezes  CV: regular rate and rhythm, normal S1 S2, no S3 or S4, no murmur, click or rub, no peripheral edema   ABDOMEN: soft, nontender, no hepatosplenomegaly, no masses and bowel sounds normal  MS: no gross musculoskeletal defects noted, no edema  SKIN: no suspicious lesions or rashes  NEURO: Normal strength and tone, mentation intact and speech normal  PSYCH: mentation appears normal, affect normal/bright      ASSESSMENT/PLAN:   Sreekanth was seen today for physical.    Diagnoses and all orders for this visit:    Routine history and physical examination of adult    Hyperlipidemia LDL goal <130    -  watch diet    Hair loss  -     Adult Dermatology  Referral; Future    Loose stools  Comments:  ? Gluten sensitivity    Other orders  -     PRIMARY CARE FOLLOW-UP SCHEDULING; Future        Patient has been advised of split billing requirements and indicates understanding: Yes  Sreekanth is a 32 yo M with cerebral infarction (head injury)    12/12: physical  Care gaps: covid, hiv, tdap, flu, labs    COUNSELING:   Reviewed preventive health counseling, as reflected in patient instructions       Regular exercise       Healthy diet/nutrition       Immunizations  Declined: Covid-19 and Influenza due to Concerns about side effects/safety            He reports that he quit smoking about 4 years ago. His smoking use included cigarettes. He started smoking about 13 years ago. He has never used smokeless tobacco.      {Counseling Resources  [place] Preventive Services Task Force  Cholesterol Screening  Health diet/nutrition  Pooled Cohorts Equation Calculator  USDA's MyPlate  ASA Prophylaxis  Lung CA Screening  Osteoporosis prevention/bone health  {Prostate Cancer Screening  Consider for men 55-69 per guidance from USPSTF     Cal Newberry MD  [facility]

## 2024-01-24 ENCOUNTER — MYC REFILL (OUTPATIENT)
Dept: FAMILY MEDICINE | Facility: CLINIC | Age: 34
End: 2024-01-24
Payer: COMMERCIAL

## 2024-01-24 DIAGNOSIS — H66.90 CHRONIC OTITIS MEDIA, UNSPECIFIED OTITIS MEDIA TYPE: ICD-10-CM

## 2024-01-26 NOTE — TELEPHONE ENCOUNTER
We do not do antibiotic without evaluation; concerning flying usually a decongestant will be helpful not an antibiotic.  Can do OTC Flonase nasal spray

## 2024-01-29 NOTE — TELEPHONE ENCOUNTER
Provider's message relayed to pt. Pt states the ear infection has cleared, so no need for evaluation.  Pt verbalized understanding.    Radha Cheung RN  [facility]

## 2024-03-14 ENCOUNTER — LAB (OUTPATIENT)
Dept: URGENT CARE | Facility: URGENT CARE | Age: 34
End: 2024-03-14
Attending: PHYSICIAN ASSISTANT
Payer: COMMERCIAL

## 2024-03-14 ENCOUNTER — E-VISIT (OUTPATIENT)
Dept: URGENT CARE | Facility: CLINIC | Age: 34
End: 2024-03-14
Payer: COMMERCIAL

## 2024-03-14 DIAGNOSIS — R05.1 ACUTE COUGH: Primary | ICD-10-CM

## 2024-03-14 DIAGNOSIS — R05.1 ACUTE COUGH: ICD-10-CM

## 2024-03-14 LAB
DEPRECATED S PYO AG THROAT QL EIA: NEGATIVE
FLUAV AG SPEC QL IA: NEGATIVE
FLUBV AG SPEC QL IA: NEGATIVE
GROUP A STREP BY PCR: NOT DETECTED

## 2024-03-14 PROCEDURE — 87804 INFLUENZA ASSAY W/OPTIC: CPT

## 2024-03-14 PROCEDURE — 87651 STREP A DNA AMP PROBE: CPT

## 2024-03-14 PROCEDURE — 99207 PR NO CHARGE LOS: CPT

## 2024-03-14 PROCEDURE — 99421 OL DIG E/M SVC 5-10 MIN: CPT | Performed by: PHYSICIAN ASSISTANT

## 2024-03-14 PROCEDURE — 87635 SARS-COV-2 COVID-19 AMP PRB: CPT

## 2024-03-14 NOTE — PATIENT INSTRUCTIONS
Sreekanth,    Thank you for choosing us for your care. I have placed an order for a lab test(s). View your full visit summary for details by clicking on the link below. You can schedule a lab only appointment right here in [employer], or by calling [phone].    You will receive your lab results and next steps via [employer] when the lab results return.    Sincerely,  Jonathon Mendez PA-C

## 2024-03-15 ENCOUNTER — TELEPHONE (OUTPATIENT)
Dept: NURSING | Facility: CLINIC | Age: 34
End: 2024-03-15
Payer: COMMERCIAL

## 2024-03-15 LAB — SARS-COV-2 RNA RESP QL NAA+PROBE: POSITIVE

## 2024-03-15 NOTE — TELEPHONE ENCOUNTER
Coronavirus (COVID-19) Notification    Caller Name (Patient, parent, daughter/son, grandparent, etc)  Patient     Reason for call  Notify of Positive Coronavirus (COVID-19) lab results, assess symptoms,  review [facility] recommendations    Lab Result    Lab test:  2019-nCoV rRt-PCR or SARS-CoV-2 PCR    Oropharyngeal AND/OR nasopharyngeal swabs is POSITIVE for 2019-nCoV RNA/SARS-COV-2 PCR (COVID-19 virus)      Gather patient reported symptoms   Assessment   Current Symptoms at time of phone call, reported by patient: (if no symptoms, document: No symptoms] Yes   Date of symptom(s) onset (if applicable) 03/12/24     If at time of call, Patients symptoms have worsened, the Patient should contact 911 or have someone drive them to Emergency Dept promptly:    If Patient calling 911, inform 911 personal that you have tested positive for the Coronavirus (COVID-19).  Place mask on and await 911 to arrive.  If Emergency Dept, If possible, please have another adult drive you to the Emergency Dept but you need to wear mask when in contact with other people.      Treatment Options:   Is patient interested in discussing COVID treatment? No.        Review information with Patient    Your result was positive. This means you have COVID-19 (coronavirus).    How can I protect others?    These guidelines are for isolating before returning to work, school or .    If you DO have symptoms  Stay home and away from others   For at least 5 days after your symptoms started, AND  You are fever free for 24 hours (with no medicine that reduces fever), AND  Your other symptoms are better  Wear a mask for 10 full days anytime you are around others    If you DON'T have symptoms  Stay home and away from others for at least 5 days after your positive test  Wear a mask for 10 full days anytime you are around others    There may be different guidelines for healthcare facilities.  Please check with the specific sites before  arriving.    If you have been told by a doctor that you were severely ill with COVID-19 or are immunocompromised, you should isolate for at least 10 days.    You should not go back to work until you meet the guidelines above for ending your home isolation. You don't need to be retested for COVID-19 before going back to work--studies show that you won't spread the virus if it's been at least 10 days since your symptoms started (or 20 days, if you have a weak immune system).    Employers, schools, and daycares: This is an official notice for this person's medical guidelines for returning in-person.  They must meet the above guidelines before going back to work, school or  in person.    You will receive a positive COVID-19 letter via [facility] or the mail soon with additional self-care information.    Would you like me to review some of that information with you now?  Yes    How can I take care of myself?    Get lots of rest. Drink extra fluids (unless a doctor has told you not to).    Take Tylenol (acetaminophen) for fever or pain. If you have liver or kidney problems, ask your family doctor if it's okay to take Tylenol.     Take either:   650 mg (two 325 mg pills) every 4 to 6 hours, or   1,000 mg (two 500 mg pills) every 8 hours as needed.   Note: Do not take more than 3,000 mg in one day. Acetaminophen is found in many medicines (both prescribed and over-the-counter medicines). Read all labels to be sure you don't take too much.    For children, check the Tylenol bottle for the right dose (based on their age or weight).    If you have other health problems (like cancer, heart failure, an organ transplant or severe kidney disease): Call your specialty clinic if you don't feel better in the next 2 days.    Know when to call 911: Emergency warning signs include:  Trouble breathing or shortness of breath  Pain or pressure in the chest that doesn't go away  Feeling confused like you haven't felt before, or not  being able to wake up  Bluish-colored lips or face      If you were tested for an upcoming procedure, please contact your provider for next steps.    Naida Howard

## 2024-05-16 ENCOUNTER — OFFICE VISIT (OUTPATIENT)
Dept: DERMATOLOGY | Facility: CLINIC | Age: 34
End: 2024-05-16
Attending: FAMILY MEDICINE
Payer: COMMERCIAL

## 2024-05-16 DIAGNOSIS — L64.9 ANDROGENETIC ALOPECIA: Primary | ICD-10-CM

## 2024-05-16 PROCEDURE — 99203 OFFICE O/P NEW LOW 30 MIN: CPT | Performed by: PHYSICIAN ASSISTANT

## 2024-05-16 RX ORDER — FINASTERIDE 1 MG/1
TABLET, FILM COATED ORAL
Qty: 90 TABLET | Refills: 3 | Status: SHIPPED | OUTPATIENT
Start: 2024-05-16

## 2024-05-16 ASSESSMENT — PAIN SCALES - GENERAL: PAINLEVEL: NO PAIN (0)

## 2024-05-16 NOTE — PROGRESS NOTES
HPI:   Chief complaints: Sreekanth Thornton is a pleasant 34 year old male who presents for evaluation of hair loss. He has had gradual hair loss along the crown of his scalp for many years. For the past 5 years he has been on 1 mg of finasteride and topical 5% minoxidil foam with great control. No issues on either med. He does have a strong fhx of hair loss. No other issues today.       PHYSICAL EXAM:    There were no vitals taken for this visit.  Skin exam performed as follows: Type 3 skin. Mood appropriate  Alert and Oriented X 3. Well developed, well nourished in no distress.  General appearance: Normal  Head including face: Normal  Eyes: conjunctiva and lids: Normal  Mouth: Lips, teeth, gums: Normal  Neck: Normal  Skin: Scalp and body hair: See below    Decreased density through vertex scalp. Scalp normal without erythema or scaling. Negative hair pull.     ASSESSMENT/PLAN:     Androgenetic alopecia - discussed diagnosis and treatment options. Doing well on current regimen.   --Continue 1 mg finasteride daily. Discussed risks of decreased libido, decreased spermatogenesis, erectile dysfunction and increased risk of breast cancer in men.   --Continue 5% minoxidil foam daily  --Photographs taken for reference           Follow-up: yearly/PRN sooner  CC:   Scribed By: Keyla Gonzalez, MS, PA-C

## 2024-05-16 NOTE — NURSING NOTE
Sreekanth Thornton's chief complaint for this visit includes:  Chief Complaint   Patient presents with    Hair Loss     Patient has noticed hair loss X 5 years. Patient has been using keeps for the last 5 years. Patient noticed this concern after shaving his head X 5 years ago. Patient hasn't noticed any clumps. Patient has tried finasteride but stopped X 6 months ago.      PCP: [facility]    Referring Provider:  Cal Nebwerry MD  [address] [facility]  [place] [address]    There were no vitals taken for this visit.  No Pain (0)        Allergies   Allergen Reactions    Azithromycin Unknown    Biaxin [Clarithromycin]     Ceclor [Cefaclor]     Erythromycin     Fusarium     Other Drug Allergy (See Comments)      Pediazole  Nigrospora    Penicillins      Family Hx    Rocephin [Ceftriaxone]          Do you need any medication refills at today's visit? TBD    Sneha Pereira MA

## 2024-05-16 NOTE — LETTER
5/16/2024         RE: Sreekanth Thornton  [address] [place] [id number]        Dear Colleague,    Thank you for referring your patient, Sreekanth Thornton, to the [facility]. Please see a copy of my visit note below.    HPI:   Chief complaints: Sreekanth Thornton is a pleasant 34 year old male who presents for evaluation of hair loss. He has had gradual hair loss along the crown of his scalp for many years. For the past 5 years he has been on 1 mg of finasteride and topical 5% minoxidil foam with great control. No issues on either med. He does have a strong fhx of hair loss. No other issues today.       PHYSICAL EXAM:    There were no vitals taken for this visit.  Skin exam performed as follows: Type 3 skin. Mood appropriate  Alert and Oriented X 3. Well developed, well nourished in no distress.  General appearance: Normal  Head including face: Normal  Eyes: conjunctiva and lids: Normal  Mouth: Lips, teeth, gums: Normal  Neck: Normal  Skin: Scalp and body hair: See below    Decreased density through vertex scalp. Scalp normal without erythema or scaling. Negative hair pull.     ASSESSMENT/PLAN:     Androgenetic alopecia - discussed diagnosis and treatment options. Doing well on current regimen.   --Continue 1 mg finasteride daily. Discussed risks of decreased libido, decreased spermatogenesis, erectile dysfunction and increased risk of breast cancer in men.   --Continue 5% minoxidil foam daily  --Photographs taken for reference           Follow-up: yearly/PRN sooner  CC:   Scribed By: Keyla Gonzalez, MS, PADotC      Again, thank you for allowing me to participate in the care of your patient.        Sincerely,        Keyla Gonzalez PA-C

## 2024-06-04 ENCOUNTER — OFFICE VISIT (OUTPATIENT)
Dept: AUDIOLOGY | Facility: CLINIC | Age: 34
End: 2024-06-04
Payer: COMMERCIAL

## 2024-06-04 DIAGNOSIS — H69.91 DYSFUNCTION OF RIGHT EUSTACHIAN TUBE: Primary | ICD-10-CM

## 2024-06-04 PROCEDURE — 92550 TYMPANOMETRY & REFLEX THRESH: CPT

## 2024-06-04 PROCEDURE — 92557 COMPREHENSIVE HEARING TEST: CPT

## 2024-06-04 NOTE — PROGRESS NOTES
AUDIOLOGY REPORT:    Patient was referred to [facility] from ENT by Dr. Jimenez for a hearing examination. Patient is here today for concerns regarding a recent right ear infection, but reports this has since dissipated. The patient was most recently seen on 11/16/2022 where results indicated normal hearing in the left ear and a normal sloping to moderate mixed hearing loss in the right ear. Sreekanth has no concerns for hearing and denies any pain, pressure, drainage or tinnitus. He was not accompanied to today's appointment     Testing:    Otoscopy:   Otoscopic exam indicates ears are clear of cerumen bilaterally     Tympanograms:    RIGHT: negative pressure      LEFT:   normal eardrum mobility    Reflexes (reported by stimulus ear): 1000 Hz  RIGHT: Ipsilateral- could not maintain seal  RIGHT: Contralateral is absent at frequencies tested  LEFT:   Ipsilateral is present at normal levels  LEFT:   Contralateral-could not maintain seal    Thresholds:   Pure Tone Thresholds assessed using conventional audiometry with good  reliability from 250-8000 Hz bilaterally using insert earphones and circumaural headphones     RIGHT:  normal hearing sensitivity at frequencies tested   *air/bone gap noted at 250 Hz in the right ear    LEFT:    normal hearing sensitivity at frequencies tested    Speech Reception Threshold:    RIGHT: 10 dB HL    LEFT:   5 dB HL  Speech Reception Thresholds are in good agreement with pure tone thresholds    Word Recognition Score:     RIGHT: 100% at 50 dB HL using NU-6 recorded word list.    LEFT:   100% at 50 dB HL using NU-6 recorded word list.    Compared to audiogram on 11/16/2022, hearing in the left ear has remained stable. Hearing in the right ear has significantly improved from 5067-7950 Hz. Discussed results with the patient.     Sreekanth is scheduled to follow-up with ENT on 6/5/2024    Citlaly Webber, [facility]  Licensed Audiologist  [place] #[id number]    06/04/24

## 2024-06-05 ENCOUNTER — OFFICE VISIT (OUTPATIENT)
Dept: OTOLARYNGOLOGY | Facility: CLINIC | Age: 34
End: 2024-06-05
Payer: COMMERCIAL

## 2024-06-05 VITALS
DIASTOLIC BLOOD PRESSURE: 81 MMHG | WEIGHT: 193 LBS | BODY MASS INDEX: 26.92 KG/M2 | HEART RATE: 89 BPM | SYSTOLIC BLOOD PRESSURE: 119 MMHG

## 2024-06-05 DIAGNOSIS — H90.2 CONDUCTIVE HEARING LOSS, UNILATERAL: Primary | ICD-10-CM

## 2024-06-05 DIAGNOSIS — H66.41 RECURRENT SUPPURATIVE OTITIS MEDIA, RIGHT: ICD-10-CM

## 2024-06-05 PROCEDURE — 99213 OFFICE O/P EST LOW 20 MIN: CPT | Performed by: OTOLARYNGOLOGY

## 2024-06-05 NOTE — LETTER
6/5/2024      Sreekanth Thornton  [address] [place] [id number]      Dear Colleague,    Thank you for referring your patient, Sreekanth Thornton, to the [facility]. Please see a copy of my visit note below.    Chief Complaint - recurrent ear infections    History of Present Illness - Sreekanth Thornton is a 34 year old male who returns to recheck ears. I saw him last 11/16/22. He has had multiple ear infections and feeling the right tympanic membrane has ruptured. No left ear infection. He had tubes as a child. He has had drainage 3 times when he feels his tympanic membrane has ruptured. He feels the hearing goes back to normal at times. Last ear infection was about 4 months ago. He is better. It can be very painful, but not now. Quit smoking a couple years ago. He has allergies. Any time he has congestion he seems to get ear plugging. Had T&A as a child. I've seen his daughter for ear problems in the past.     Tests personally reviewed today for this visit:   Audiogram - normal left hearing, right hearing very mild air-bone gap, but the conductive hearing loss from 2022 has improved.   Tympanograms - type A left, type C right with negative pressure    Past Medical History - healthy     Current Medications -   Current Outpatient Medications:      amoxicillin-clavulanate (AUGMENTIN) 875-125 MG tablet, Take 1 tablet by mouth 2 times daily (Patient not taking: Reported on 11/16/2022), Disp: 14 tablet, Rfl: 0     finasteride (PROPECIA) 1 MG tablet, 1 tab po daily, Disp: 90 tablet, Rfl: 3     finasteride (PROPECIA) 1 MG tablet, Take 1 mg by mouth daily, Disp: , Rfl:      pseudoePHEDrine (SUDAFED) 120 MG 12 hr tablet, Take 1 tablet (120 mg) by mouth daily, Disp: 30 tablet, Rfl: 0    Allergies -   Allergies   Allergen Reactions     Azithromycin Unknown     Biaxin [Clarithromycin]      Ceclor [Cefaclor]      Erythromycin      Fusarium      Other Drug Allergy (See Comments)       Pediazole  Nigrospora     Penicillins      Family Hx     Rocephin [Ceftriaxone]        Social History -   Social History     Socioeconomic History     Marital status:    Tobacco Use     Smoking status: Former     Packs/day: 0.00     Years: 10.00     Pack years: 0.00     Types: Cigarettes     Start date: 1/1/2010     Quit date: 1/1/2019     Years since quitting: 3.8     Smokeless tobacco: Never     Tobacco comments:     Haven't used in well over 6 months and don't plan to   Substance and Sexual Activity     Alcohol use: Yes     Drug use: Not Currently     Types: Marijuana     Sexual activity: Yes     Partners: Female     Birth control/protection: None     Comment:    Other Topics Concern     Parent/sibling w/ CABG, MI or angioplasty before 65F 55M? No       Family History -   Family History   Problem Relation Age of Onset     Hypertension Father      Rheumatoid Arthritis Mother      Diabetes Maternal Grandmother      Cancer Maternal Grandfather      Breast Cancer Maternal Grandfather      Breast Cancer Paternal Grandmother        Physical Exam  General - The patient is nontoxic, in no distress.  Alert , answers questions and cooperates with examination appropriately.   Voice and Breathing - The patient was breathing comfortably without the use of accessory muscles. There was no wheezing, stridor, or stertor.  The patients voice was clear and strong.  Ears - clean canals. The tympanic membrane on the right is intact, no middle ear effusion. No acute infection. No fluid or purulence was seen in the external canal. The tympanic membrane on the left is intact, no middle ear effusion. No acute infection.  No fluid or purulence was seen in the external canal.       Assessment and Plan -     ICD-10-CM    1. Conductive hearing loss, unilateral  H90.2       2. Recurrent suppurative otitis media, right  H66.41           Sreekanth Thornton is a 34 year old male who returns with chronic right eustachian tube  dysfunction that leads to recurrent otitis media.  Left ear seems fine.  He does have mild conductive hearing loss in the right ear that may be chronic due to recurrent infections.  His current negative middle ear pressure may also be contributing.  Fortunately his hearing is much better than a few years ago when I saw him and he had a fusion.  He continues to pop his ears.  He can do this as well as take decongestants and allergy medication help eustachian tube dysfunction.  We also discussed right myringotomy with tube but he prefers to hold off on that for now.  If he gets an ear infection he can MyChart for antibiotics.  If things worsen he should return.      Jesus Jimenez MD  Otolaryngology  [facility]        Again, thank you for allowing me to participate in the care of your patient.        Sincerely,        Jesus Jimenez MD

## 2024-06-05 NOTE — PROGRESS NOTES
Chief Complaint - recurrent ear infections    History of Present Illness - Sreekanth Thornton is a 34 year old male who returns to recheck ears. I saw him last 11/16/22. He has had multiple ear infections and feeling the right tympanic membrane has ruptured. No left ear infection. He had tubes as a child. He has had drainage 3 times when he feels his tympanic membrane has ruptured. He feels the hearing goes back to normal at times. Last ear infection was about 4 months ago. He is better. It can be very painful, but not now. Quit smoking a couple years ago. He has allergies. Any time he has congestion he seems to get ear plugging. Had T&A as a child. I've seen his daughter for ear problems in the past.     Tests personally reviewed today for this visit:   Audiogram - normal left hearing, right hearing very mild air-bone gap, but the conductive hearing loss from 2022 has improved.   Tympanograms - type A left, type C right with negative pressure    Past Medical History - healthy     Current Medications -   Current Outpatient Medications:     amoxicillin-clavulanate (AUGMENTIN) 875-125 MG tablet, Take 1 tablet by mouth 2 times daily (Patient not taking: Reported on 11/16/2022), Disp: 14 tablet, Rfl: 0    finasteride (PROPECIA) 1 MG tablet, 1 tab po daily, Disp: 90 tablet, Rfl: 3    finasteride (PROPECIA) 1 MG tablet, Take 1 mg by mouth daily, Disp: , Rfl:     pseudoePHEDrine (SUDAFED) 120 MG 12 hr tablet, Take 1 tablet (120 mg) by mouth daily, Disp: 30 tablet, Rfl: 0    Allergies -   Allergies   Allergen Reactions    Azithromycin Unknown    Biaxin [Clarithromycin]     Ceclor [Cefaclor]     Erythromycin     Fusarium     Other Drug Allergy (See Comments)      Pediazole  Nigrospora    Penicillins      Family Hx    Rocephin [Ceftriaxone]        Social History -   Social History     Socioeconomic History    Marital status:    Tobacco Use    Smoking status: Former     Packs/day: 0.00     Years: 10.00     Pack years:  0.00     Types: Cigarettes     Start date: 1/1/2010     Quit date: 1/1/2019     Years since quitting: 3.8    Smokeless tobacco: Never    Tobacco comments:     Haven't used in well over 6 months and don't plan to   Substance and Sexual Activity    Alcohol use: Yes    Drug use: Not Currently     Types: Marijuana    Sexual activity: Yes     Partners: Female     Birth control/protection: None     Comment:    Other Topics Concern    Parent/sibling w/ CABG, MI or angioplasty before 65F 55M? No       Family History -   Family History   Problem Relation Age of Onset    Hypertension Father     Rheumatoid Arthritis Mother     Diabetes Maternal Grandmother     Cancer Maternal Grandfather     Breast Cancer Maternal Grandfather     Breast Cancer Paternal Grandmother        Physical Exam  General - The patient is nontoxic, in no distress.  Alert , answers questions and cooperates with examination appropriately.   Voice and Breathing - The patient was breathing comfortably without the use of accessory muscles. There was no wheezing, stridor, or stertor.  The patients voice was clear and strong.  Ears - clean canals. The tympanic membrane on the right is intact, no middle ear effusion. No acute infection. No fluid or purulence was seen in the external canal. The tympanic membrane on the left is intact, no middle ear effusion. No acute infection.  No fluid or purulence was seen in the external canal.       Assessment and Plan -     ICD-10-CM    1. Conductive hearing loss, unilateral  H90.2       2. Recurrent suppurative otitis media, right  H66.41           Sreekanth Thornton is a 34 year old male who returns with chronic right eustachian tube dysfunction that leads to recurrent otitis media.  Left ear seems fine.  He does have mild conductive hearing loss in the right ear that may be chronic due to recurrent infections.  His current negative middle ear pressure may also be contributing.  Fortunately his hearing is much  better than a few years ago when I saw him and he had a fusion.  He continues to pop his ears.  He can do this as well as take decongestants and allergy medication help eustachian tube dysfunction.  We also discussed right myringotomy with tube but he prefers to hold off on that for now.  If he gets an ear infection he can MyChart for antibiotics.  If things worsen he should return.      Jesus Jimenez MD  Otolaryngology  [facility]

## 2024-11-12 ENCOUNTER — PATIENT OUTREACH (OUTPATIENT)
Dept: CARE COORDINATION | Facility: CLINIC | Age: 34
End: 2024-11-12
Payer: COMMERCIAL

## 2024-11-26 ENCOUNTER — PATIENT OUTREACH (OUTPATIENT)
Dept: CARE COORDINATION | Facility: CLINIC | Age: 34
End: 2024-11-26
Payer: COMMERCIAL

## 2025-01-04 ENCOUNTER — MYC REFILL (OUTPATIENT)
Dept: DERMATOLOGY | Facility: CLINIC | Age: 35
End: 2025-01-04
Payer: COMMERCIAL

## 2025-01-04 DIAGNOSIS — L64.9 ANDROGENETIC ALOPECIA: ICD-10-CM

## 2025-01-06 RX ORDER — FINASTERIDE 1 MG/1
TABLET, FILM COATED ORAL
Qty: 90 TABLET | Refills: 3 | Status: SHIPPED | OUTPATIENT
Start: 2025-01-06

## 2025-01-19 ENCOUNTER — HEALTH MAINTENANCE LETTER (OUTPATIENT)
Age: 35
End: 2025-01-19

## 2025-05-07 ENCOUNTER — OFFICE VISIT (OUTPATIENT)
Dept: DERMATOLOGY | Facility: CLINIC | Age: 35
End: 2025-05-07
Payer: COMMERCIAL

## 2025-05-07 DIAGNOSIS — L64.9 ANDROGENETIC ALOPECIA: ICD-10-CM

## 2025-05-07 PROCEDURE — 99213 OFFICE O/P EST LOW 20 MIN: CPT | Performed by: PHYSICIAN ASSISTANT

## 2025-05-07 RX ORDER — FINASTERIDE 1 MG/1
TABLET, FILM COATED ORAL
Qty: 90 TABLET | Refills: 3 | Status: SHIPPED | OUTPATIENT
Start: 2025-05-07

## 2025-05-07 NOTE — LETTER
5/7/2025      Sreekanth Thornton  [address] [place] [id number]      Dear Colleague,    Thank you for referring your patient, Sreekanth Thornton, to the [facility]. Please see a copy of my visit note below.    HPI:   Chief complaints: Sreekanth Thornton is a pleasant 35 year old male who presents for recheck androgenetic alopecia. He has had gradual hair loss along the crown of his scalp for many years. For the past 5 years he has been on 1 mg of finasteride and topical 5% minoxidil foam with great control. No issues on either med. He does have a strong fhx of hair loss. No other issues today.       PHYSICAL EXAM:    There were no vitals taken for this visit.  Skin exam performed as follows: Type 3 skin. Mood appropriate  Alert and Oriented X 3. Well developed, well nourished in no distress.  General appearance: Normal  Head including face: Normal  Eyes: conjunctiva and lids: Normal  Mouth: Lips, teeth, gums: Normal  Neck: Normal  Skin: Scalp and body hair: See below    Decreased density through vertex scalp. Scalp normal without erythema or scaling. Negative hair pull.     ASSESSMENT/PLAN:     Androgenetic alopecia - stable.   --Continue 1 mg finasteride daily. Discussed risks of decreased libido, decreased spermatogenesis, erectile dysfunction and increased risk of breast cancer in men.   --Continue 5% minoxidil foam daily  --Photographs taken for reference           Follow-up: yearly/PRN sooner        Again, thank you for allowing me to participate in the care of your patient.        Sincerely,        Yandy Moses PA-C    Electronically signed

## 2025-05-07 NOTE — PROGRESS NOTES
HPI:   Chief complaints: Sreekanth Thornton is a pleasant 35 year old male who presents for recheck androgenetic alopecia. He has had gradual hair loss along the crown of his scalp for many years. For the past 5 years he has been on 1 mg of finasteride and topical 5% minoxidil foam with great control. No issues on either med. He does have a strong fhx of hair loss. No other issues today.       PHYSICAL EXAM:    There were no vitals taken for this visit.  Skin exam performed as follows: Type 3 skin. Mood appropriate  Alert and Oriented X 3. Well developed, well nourished in no distress.  General appearance: Normal  Head including face: Normal  Eyes: conjunctiva and lids: Normal  Mouth: Lips, teeth, gums: Normal  Neck: Normal  Skin: Scalp and body hair: See below    Decreased density through vertex scalp. Scalp normal without erythema or scaling. Negative hair pull.     ASSESSMENT/PLAN:     Androgenetic alopecia - stable.   --Continue 1 mg finasteride daily. Discussed risks of decreased libido, decreased spermatogenesis, erectile dysfunction and increased risk of breast cancer in men.   --Continue 5% minoxidil foam daily  --Photographs taken for reference           Follow-up: yearly/PRN sooner

## 2025-06-10 ENCOUNTER — PATIENT OUTREACH (OUTPATIENT)
Dept: CARE COORDINATION | Facility: CLINIC | Age: 35
End: 2025-06-10
Payer: COMMERCIAL

## 2025-08-15 ENCOUNTER — ANCILLARY PROCEDURE (OUTPATIENT)
Dept: GENERAL RADIOLOGY | Facility: CLINIC | Age: 35
End: 2025-08-15
Attending: PHYSICIAN ASSISTANT
Payer: COMMERCIAL

## 2025-08-15 DIAGNOSIS — S69.92XA WRIST INJURY, LEFT, INITIAL ENCOUNTER: ICD-10-CM

## 2025-08-15 PROCEDURE — 73110 X-RAY EXAM OF WRIST: CPT | Mod: TC | Performed by: RADIOLOGY
